# Patient Record
Sex: FEMALE | Race: WHITE | NOT HISPANIC OR LATINO | URBAN - METROPOLITAN AREA
[De-identification: names, ages, dates, MRNs, and addresses within clinical notes are randomized per-mention and may not be internally consistent; named-entity substitution may affect disease eponyms.]

---

## 2021-06-15 ENCOUNTER — OFFICE VISIT - HEALTHEAST (OUTPATIENT)
Dept: FAMILY MEDICINE | Facility: CLINIC | Age: 71
End: 2021-06-15

## 2021-06-15 DIAGNOSIS — H61.22 IMPACTED CERUMEN OF LEFT EAR: ICD-10-CM

## 2021-06-15 DIAGNOSIS — J20.9 ACUTE BRONCHITIS, UNSPECIFIED ORGANISM: ICD-10-CM

## 2021-06-15 RX ORDER — DULOXETIN HYDROCHLORIDE 30 MG/1
CAPSULE, DELAYED RELEASE ORAL
Status: SHIPPED | COMMUNITY
Start: 2021-06-15

## 2021-06-15 RX ORDER — FELODIPINE 5 MG/1
TABLET, EXTENDED RELEASE ORAL
Status: SHIPPED | COMMUNITY
Start: 2021-06-15

## 2021-06-15 RX ORDER — LEVOTHYROXINE SODIUM 75 UG/1
TABLET ORAL
Status: SHIPPED | COMMUNITY
Start: 2021-06-15

## 2021-06-15 RX ORDER — LISINOPRIL 20 MG/1
TABLET ORAL
Status: SHIPPED | COMMUNITY
Start: 2021-06-15

## 2021-06-15 RX ORDER — ALBUTEROL SULFATE 90 UG/1
2 AEROSOL, METERED RESPIRATORY (INHALATION) EVERY 4 HOURS PRN
Qty: 1 EACH | Refills: 0 | Status: SHIPPED | OUTPATIENT
Start: 2021-06-15 | End: 2021-06-20

## 2021-06-15 RX ORDER — TRAMADOL HYDROCHLORIDE 50 MG/1
TABLET ORAL
Status: SHIPPED | COMMUNITY
Start: 2021-06-15

## 2021-06-15 RX ORDER — CLONIDINE HYDROCHLORIDE 0.1 MG/1
TABLET ORAL
Status: SHIPPED | COMMUNITY
Start: 2021-06-15

## 2021-06-15 RX ORDER — ATORVASTATIN CALCIUM 20 MG/1
TABLET, FILM COATED ORAL
Status: SHIPPED | COMMUNITY
Start: 2021-06-15

## 2021-06-21 ENCOUNTER — COMMUNICATION - HEALTHEAST (OUTPATIENT)
Dept: INTERNAL MEDICINE | Facility: CLINIC | Age: 71
End: 2021-06-21

## 2021-06-26 NOTE — PATIENT INSTRUCTIONS - HE
Cough:  Patient was educated on the natural course of viral condition. Take medications as directed. Side effects discussed.  Explained that most cases of bronchitis are viral.  If symptoms do not improve in 4 days then we can prescribe Z pack. Conservative measures discussed including rest, increased fluids, humidifier, and over-the-counter cough suppressants. See your primary care provider if symptoms do not improve in 7 days. Seek emergency care if you develop shortness of breath or fever over 103.    Ear wax:  Patient was educated on the natural  course of the condition. Conservative measures to prevent ear wax build up including applying a few drops of mineral oil or earwax softener (Debrox) were discussed.  Avoid using q tips as this may push ear wax further in to the ear canal.  See your primary care provider if symptoms worsen or do not improve in 7 days. Seek emergency care if you develop severe ear pain.

## 2021-06-26 NOTE — TELEPHONE ENCOUNTER
Patient was seen last week with GEOFFREY Perry.  Patient is coughing up greenish yellowish mucus.  She is wondering if she can get an antibiotic called into her pharmacy.  She uses [facility] in [place], [place].  She would like a call back at

## 2021-06-26 NOTE — PROGRESS NOTES
URGENT CARE VISIT:    SUBJECTIVE:   Radha Giron is a 70 y.o. female presenting with a chief complaint of productive cough.  Onset was 3 day(s) ago. She denies the following symptoms: fever, chills, nasal congestion, sore throat and dyspnea. Course of illness is gradually worsening. Treatment measures tried include none with no relief of symptoms.  Predisposing factors include none.    PMH: History reviewed. No pertinent past medical history.  Allergies: Verapamil and Amlodipine   Medications:   Current Outpatient Medications   Medication Sig Dispense Refill     atorvastatin (LIPITOR) 20 MG tablet atorvastatin 20 mg tablet   TK 1 T PO D FOR CHOLESTEROL       cloNIDine HCL (CATAPRES) 0.1 MG tablet clonidine HCl 0.1 mg tablet   TAKE 1 TABLET BY MOUTH TWICE DAILY       DULoxetine (CYMBALTA) 30 MG capsule duloxetine 30 mg capsule,delayed release   TAKE 2 CAPSULES BY MOUTH DAILY       felodipine (PLENDIL) 5 MG 24 hr tablet felodipine ER 5 mg tablet,extended release 24 hr   TAKE 1 TABLET BY MOUTH EVERY DAY       levothyroxine (SYNTHROID, LEVOTHROID) 75 MCG tablet levothyroxine 75 mcg tablet   TAKE 1 TABLET BY MOUTH DAILY       lisinopriL (PRINIVIL,ZESTRIL) 20 MG tablet Take by mouth.       traMADoL (ULTRAM) 50 mg tablet tramadol 50 mg tablet   TAKE 1 TABLET BY MOUTH EVERY 6 HOURS AS NEEDED FOR PAIN       albuterol (PROAIR HFA;PROVENTIL HFA;VENTOLIN HFA) 90 mcg/actuation inhaler Inhale 2 puffs every 4 (four) hours as needed (cough). 1 each 0     No current facility-administered medications for this visit.      Social History:   Social History     Tobacco Use     Smoking status: Never Smoker     Smokeless tobacco: Never Used   Substance Use Topics     Alcohol use: Not on file        ROS:  Review of systems negative except as stated above.    OBJECTIVE:  /80   Pulse 88   Temp 99.3  F (37.4  C) (Oral)   Resp 16   SpO2 95%     GENERAL APPEARANCE: healthy, alert and no distress  EYES: conjunctiva clear  HENT:  Left TM was impacted with cerumen. Right external canal is sultana. ear canals normal. Non-erythematous oropharynx. Uvula midline.   NECK: supple, nontender, no lymphadenopathy  RESP: lungs clear to auscultation - no rales, rhonchi or wheezes  CV: regular rate and rhythm, normal S1 S2, no murmur noted  SKIN: no suspicious lesions or rashes    ASSESSMENT:  1. Acute bronchitis, unspecified organism  albuterol (PROAIR HFA;PROVENTIL HFA;VENTOLIN HFA) 90 mcg/actuation inhaler   2. Impacted cerumen of left ear  Ear cerumen removal     PLAN:  Ear irrigation: left ear wax successfully removed via irrigation    Patient Instructions   Cough:  Patient was educated on the natural course of viral condition. Take medications as directed. Side effects discussed.  Explained that most cases of bronchitis are viral.  If symptoms do not improve in 4 days then we can prescribe Z pack. Conservative measures discussed including rest, increased fluids, humidifier, and over-the-counter cough suppressants. See your primary care provider if symptoms do not improve in 7 days. Seek emergency care if you develop shortness of breath or fever over 103.    Ear wax:  Patient was educated on the natural  course of the condition. Conservative measures to prevent ear wax build up including applying a few drops of mineral oil or earwax softener (Debrox) were discussed.  Avoid using q tips as this may push ear wax further in to the ear canal.  See your primary care provider if symptoms worsen or do not improve in 7 days. Seek emergency care if you develop severe ear pain.      Patient verbalized understanding and is agreeable to plan. The patient was discharged ambulatory and in stable condition.    Cheryl Philippe ....................  6/15/2021   3:24 PM

## 2021-07-04 NOTE — ADDENDUM NOTE
Addendum Note by Amy Dobson PA-C at 6/15/2021  3:00 PM     Author: Amy Dobson PA-C Service: -- Author Type: Physician Assistant    Filed: 6/22/2021  3:35 PM Encounter Date: 6/15/2021 Status: Signed    : Amy Dobson PA-C (Physician Assistant)    Addended by: AMY DOBSON on: 6/22/2021 03:35 PM        Modules accepted: Orders

## 2021-07-06 VITALS
SYSTOLIC BLOOD PRESSURE: 128 MMHG | HEART RATE: 88 BPM | RESPIRATION RATE: 16 BRPM | TEMPERATURE: 99.3 F | OXYGEN SATURATION: 95 % | DIASTOLIC BLOOD PRESSURE: 80 MMHG